# Patient Record
Sex: FEMALE | HISPANIC OR LATINO | URBAN - METROPOLITAN AREA
[De-identification: names, ages, dates, MRNs, and addresses within clinical notes are randomized per-mention and may not be internally consistent; named-entity substitution may affect disease eponyms.]

---

## 2017-08-03 ENCOUNTER — INPATIENT (INPATIENT)
Facility: HOSPITAL | Age: 82
LOS: 1 days | Discharge: HOME | End: 2017-08-05
Attending: INTERNAL MEDICINE | Admitting: INTERNAL MEDICINE
Payer: MEDICAID

## 2017-08-03 VITALS — WEIGHT: 104.94 LBS | HEIGHT: 62 IN

## 2017-08-03 LAB
ACETONE SERPL-MCNC: NEGATIVE — SIGNIFICANT CHANGE UP
ALBUMIN SERPL ELPH-MCNC: 4 G/DL — SIGNIFICANT CHANGE UP (ref 3.3–5)
ALP SERPL-CCNC: 243 U/L — HIGH (ref 40–120)
ALT FLD-CCNC: 25 U/L — SIGNIFICANT CHANGE UP (ref 12–78)
ANION GAP SERPL CALC-SCNC: 7 MMOL/L — SIGNIFICANT CHANGE UP (ref 5–17)
ANION GAP SERPL CALC-SCNC: 8 MMOL/L — SIGNIFICANT CHANGE UP (ref 5–17)
APTT BLD: 26.3 SEC — LOW (ref 27.5–37.4)
AST SERPL-CCNC: 19 U/L — SIGNIFICANT CHANGE UP (ref 15–37)
BASOPHILS # BLD AUTO: 0.1 K/UL — SIGNIFICANT CHANGE UP (ref 0–0.2)
BASOPHILS NFR BLD AUTO: 1.3 % — SIGNIFICANT CHANGE UP (ref 0–2)
BILIRUB SERPL-MCNC: 0.5 MG/DL — SIGNIFICANT CHANGE UP (ref 0.2–1.2)
BUN SERPL-MCNC: 11 MG/DL — SIGNIFICANT CHANGE UP (ref 7–23)
BUN SERPL-MCNC: 12 MG/DL — SIGNIFICANT CHANGE UP (ref 7–23)
CALCIUM SERPL-MCNC: 8.2 MG/DL — LOW (ref 8.5–10.1)
CALCIUM SERPL-MCNC: 9.1 MG/DL — SIGNIFICANT CHANGE UP (ref 8.5–10.1)
CHLORIDE SERPL-SCNC: 101 MMOL/L — SIGNIFICANT CHANGE UP (ref 96–108)
CHLORIDE SERPL-SCNC: 105 MMOL/L — SIGNIFICANT CHANGE UP (ref 96–108)
CK SERPL-CCNC: 64 U/L — SIGNIFICANT CHANGE UP (ref 26–192)
CO2 SERPL-SCNC: 26 MMOL/L — SIGNIFICANT CHANGE UP (ref 22–31)
CO2 SERPL-SCNC: 27 MMOL/L — SIGNIFICANT CHANGE UP (ref 22–31)
CREAT SERPL-MCNC: 0.58 MG/DL — SIGNIFICANT CHANGE UP (ref 0.5–1.3)
CREAT SERPL-MCNC: 0.75 MG/DL — SIGNIFICANT CHANGE UP (ref 0.5–1.3)
EOSINOPHIL # BLD AUTO: 0.4 K/UL — SIGNIFICANT CHANGE UP (ref 0–0.5)
EOSINOPHIL NFR BLD AUTO: 5 % — SIGNIFICANT CHANGE UP (ref 0–6)
GLUCOSE SERPL-MCNC: 343 MG/DL — HIGH (ref 70–99)
GLUCOSE SERPL-MCNC: 355 MG/DL — HIGH (ref 70–99)
HCT VFR BLD CALC: 45.3 % — HIGH (ref 34.5–45)
HGB BLD-MCNC: 14.9 G/DL — SIGNIFICANT CHANGE UP (ref 11.5–15.5)
INR BLD: 0.97 RATIO — SIGNIFICANT CHANGE UP (ref 0.88–1.16)
LYMPHOCYTES # BLD AUTO: 3.3 K/UL — SIGNIFICANT CHANGE UP (ref 1–3.3)
LYMPHOCYTES # BLD AUTO: 44.2 % — HIGH (ref 13–44)
MCHC RBC-ENTMCNC: 26.8 PG — LOW (ref 27–34)
MCHC RBC-ENTMCNC: 33 GM/DL — SIGNIFICANT CHANGE UP (ref 32–36)
MCV RBC AUTO: 81.2 FL — SIGNIFICANT CHANGE UP (ref 80–100)
MONOCYTES # BLD AUTO: 0.6 K/UL — SIGNIFICANT CHANGE UP (ref 0–0.9)
MONOCYTES NFR BLD AUTO: 7.4 % — SIGNIFICANT CHANGE UP (ref 2–14)
NEUTROPHILS # BLD AUTO: 3.2 K/UL — SIGNIFICANT CHANGE UP (ref 1.8–7.4)
NEUTROPHILS NFR BLD AUTO: 42.1 % — LOW (ref 43–77)
PLATELET # BLD AUTO: 242 K/UL — SIGNIFICANT CHANGE UP (ref 150–400)
POTASSIUM SERPL-MCNC: 3.9 MMOL/L — SIGNIFICANT CHANGE UP (ref 3.5–5.3)
POTASSIUM SERPL-MCNC: 4 MMOL/L — SIGNIFICANT CHANGE UP (ref 3.5–5.3)
POTASSIUM SERPL-SCNC: 3.9 MMOL/L — SIGNIFICANT CHANGE UP (ref 3.5–5.3)
POTASSIUM SERPL-SCNC: 4 MMOL/L — SIGNIFICANT CHANGE UP (ref 3.5–5.3)
PROT SERPL-MCNC: 8 GM/DL — SIGNIFICANT CHANGE UP (ref 6–8.3)
PROTHROM AB SERPL-ACNC: 10.5 SEC — SIGNIFICANT CHANGE UP (ref 9.8–12.7)
RBC # BLD: 5.58 M/UL — HIGH (ref 3.8–5.2)
RBC # FLD: 12.4 % — SIGNIFICANT CHANGE UP (ref 10.3–14.5)
SODIUM SERPL-SCNC: 136 MMOL/L — SIGNIFICANT CHANGE UP (ref 135–145)
SODIUM SERPL-SCNC: 138 MMOL/L — SIGNIFICANT CHANGE UP (ref 135–145)
TROPONIN I SERPL-MCNC: 0.01 NG/ML — SIGNIFICANT CHANGE UP (ref 0.01–0.04)
TROPONIN I SERPL-MCNC: 0.02 NG/ML — SIGNIFICANT CHANGE UP (ref 0.01–0.04)
TROPONIN I SERPL-MCNC: <0.015 NG/ML — SIGNIFICANT CHANGE UP (ref 0.01–0.04)
WBC # BLD: 7.5 K/UL — SIGNIFICANT CHANGE UP (ref 3.8–10.5)
WBC # FLD AUTO: 7.5 K/UL — SIGNIFICANT CHANGE UP (ref 3.8–10.5)

## 2017-08-03 PROCEDURE — 70547 MR ANGIOGRAPHY NECK W/O DYE: CPT | Mod: 26

## 2017-08-03 PROCEDURE — 99285 EMERGENCY DEPT VISIT HI MDM: CPT

## 2017-08-03 PROCEDURE — 70551 MRI BRAIN STEM W/O DYE: CPT | Mod: 26

## 2017-08-03 PROCEDURE — 93010 ELECTROCARDIOGRAM REPORT: CPT

## 2017-08-03 PROCEDURE — 70450 CT HEAD/BRAIN W/O DYE: CPT | Mod: 26

## 2017-08-03 PROCEDURE — 93306 TTE W/DOPPLER COMPLETE: CPT | Mod: 26

## 2017-08-03 PROCEDURE — 99223 1ST HOSP IP/OBS HIGH 75: CPT

## 2017-08-03 PROCEDURE — 71010: CPT | Mod: 26

## 2017-08-03 RX ORDER — SODIUM CHLORIDE 9 MG/ML
1000 INJECTION, SOLUTION INTRAVENOUS
Qty: 0 | Refills: 0 | Status: DISCONTINUED | OUTPATIENT
Start: 2017-08-03 | End: 2017-08-04

## 2017-08-03 RX ORDER — DEXTROSE 50 % IN WATER 50 %
25 SYRINGE (ML) INTRAVENOUS ONCE
Qty: 0 | Refills: 0 | Status: DISCONTINUED | OUTPATIENT
Start: 2017-08-03 | End: 2017-08-05

## 2017-08-03 RX ORDER — ASPIRIN/CALCIUM CARB/MAGNESIUM 324 MG
81 TABLET ORAL DAILY
Qty: 0 | Refills: 0 | Status: DISCONTINUED | OUTPATIENT
Start: 2017-08-03 | End: 2017-08-04

## 2017-08-03 RX ORDER — SODIUM CHLORIDE 9 MG/ML
3 INJECTION INTRAMUSCULAR; INTRAVENOUS; SUBCUTANEOUS ONCE
Qty: 0 | Refills: 0 | Status: COMPLETED | OUTPATIENT
Start: 2017-08-03 | End: 2017-08-03

## 2017-08-03 RX ORDER — INSULIN LISPRO 100/ML
VIAL (ML) SUBCUTANEOUS
Qty: 0 | Refills: 0 | Status: DISCONTINUED | OUTPATIENT
Start: 2017-08-03 | End: 2017-08-05

## 2017-08-03 RX ORDER — ASPIRIN/CALCIUM CARB/MAGNESIUM 324 MG
300 TABLET ORAL ONCE
Qty: 0 | Refills: 0 | Status: COMPLETED | OUTPATIENT
Start: 2017-08-03 | End: 2017-08-03

## 2017-08-03 RX ORDER — LEVETIRACETAM 250 MG/1
1000 TABLET, FILM COATED ORAL ONCE
Qty: 0 | Refills: 0 | Status: COMPLETED | OUTPATIENT
Start: 2017-08-03 | End: 2017-08-03

## 2017-08-03 RX ORDER — INSULIN HUMAN 100 [IU]/ML
4 INJECTION, SOLUTION SUBCUTANEOUS ONCE
Qty: 0 | Refills: 0 | Status: COMPLETED | OUTPATIENT
Start: 2017-08-03 | End: 2017-08-03

## 2017-08-03 RX ORDER — HEPARIN SODIUM 5000 [USP'U]/ML
5000 INJECTION INTRAVENOUS; SUBCUTANEOUS EVERY 8 HOURS
Qty: 0 | Refills: 0 | Status: DISCONTINUED | OUTPATIENT
Start: 2017-08-03 | End: 2017-08-05

## 2017-08-03 RX ORDER — SODIUM CHLORIDE 9 MG/ML
1000 INJECTION, SOLUTION INTRAVENOUS
Qty: 0 | Refills: 0 | Status: DISCONTINUED | OUTPATIENT
Start: 2017-08-03 | End: 2017-08-05

## 2017-08-03 RX ORDER — GLUCAGON INJECTION, SOLUTION 0.5 MG/.1ML
1 INJECTION, SOLUTION SUBCUTANEOUS ONCE
Qty: 0 | Refills: 0 | Status: DISCONTINUED | OUTPATIENT
Start: 2017-08-03 | End: 2017-08-05

## 2017-08-03 RX ORDER — INSULIN HUMAN 100 [IU]/ML
INJECTION, SOLUTION SUBCUTANEOUS EVERY 6 HOURS
Qty: 0 | Refills: 0 | Status: DISCONTINUED | OUTPATIENT
Start: 2017-08-03 | End: 2017-08-03

## 2017-08-03 RX ORDER — SODIUM CHLORIDE 9 MG/ML
2000 INJECTION INTRAMUSCULAR; INTRAVENOUS; SUBCUTANEOUS ONCE
Qty: 0 | Refills: 0 | Status: COMPLETED | OUTPATIENT
Start: 2017-08-03 | End: 2017-08-03

## 2017-08-03 RX ORDER — INSULIN GLARGINE 100 [IU]/ML
10 INJECTION, SOLUTION SUBCUTANEOUS AT BEDTIME
Qty: 0 | Refills: 0 | Status: DISCONTINUED | OUTPATIENT
Start: 2017-08-03 | End: 2017-08-04

## 2017-08-03 RX ORDER — DEXTROSE 50 % IN WATER 50 %
1 SYRINGE (ML) INTRAVENOUS ONCE
Qty: 0 | Refills: 0 | Status: DISCONTINUED | OUTPATIENT
Start: 2017-08-03 | End: 2017-08-03

## 2017-08-03 RX ORDER — DEXTROSE 50 % IN WATER 50 %
12.5 SYRINGE (ML) INTRAVENOUS ONCE
Qty: 0 | Refills: 0 | Status: DISCONTINUED | OUTPATIENT
Start: 2017-08-03 | End: 2017-08-05

## 2017-08-03 RX ORDER — LEVETIRACETAM 250 MG/1
500 TABLET, FILM COATED ORAL EVERY 12 HOURS
Qty: 0 | Refills: 0 | Status: DISCONTINUED | OUTPATIENT
Start: 2017-08-03 | End: 2017-08-04

## 2017-08-03 RX ORDER — DEXTROSE 50 % IN WATER 50 %
12.5 SYRINGE (ML) INTRAVENOUS ONCE
Qty: 0 | Refills: 0 | Status: DISCONTINUED | OUTPATIENT
Start: 2017-08-03 | End: 2017-08-03

## 2017-08-03 RX ORDER — PANTOPRAZOLE SODIUM 20 MG/1
40 TABLET, DELAYED RELEASE ORAL
Qty: 0 | Refills: 0 | Status: DISCONTINUED | OUTPATIENT
Start: 2017-08-03 | End: 2017-08-04

## 2017-08-03 RX ORDER — DEXTROSE 50 % IN WATER 50 %
1 SYRINGE (ML) INTRAVENOUS ONCE
Qty: 0 | Refills: 0 | Status: DISCONTINUED | OUTPATIENT
Start: 2017-08-03 | End: 2017-08-05

## 2017-08-03 RX ORDER — INSULIN LISPRO 100/ML
VIAL (ML) SUBCUTANEOUS AT BEDTIME
Qty: 0 | Refills: 0 | Status: DISCONTINUED | OUTPATIENT
Start: 2017-08-03 | End: 2017-08-05

## 2017-08-03 RX ORDER — SODIUM CHLORIDE 9 MG/ML
1000 INJECTION, SOLUTION INTRAVENOUS
Qty: 0 | Refills: 0 | Status: DISCONTINUED | OUTPATIENT
Start: 2017-08-03 | End: 2017-08-03

## 2017-08-03 RX ADMIN — LEVETIRACETAM 400 MILLIGRAM(S): 250 TABLET, FILM COATED ORAL at 10:32

## 2017-08-03 RX ADMIN — HEPARIN SODIUM 5000 UNIT(S): 5000 INJECTION INTRAVENOUS; SUBCUTANEOUS at 22:22

## 2017-08-03 RX ADMIN — LEVETIRACETAM 400 MILLIGRAM(S): 250 TABLET, FILM COATED ORAL at 20:24

## 2017-08-03 RX ADMIN — Medication 1 MILLIGRAM(S): at 08:40

## 2017-08-03 RX ADMIN — SODIUM CHLORIDE 3 MILLILITER(S): 9 INJECTION INTRAMUSCULAR; INTRAVENOUS; SUBCUTANEOUS at 08:20

## 2017-08-03 RX ADMIN — INSULIN GLARGINE 10 UNIT(S): 100 INJECTION, SOLUTION SUBCUTANEOUS at 22:24

## 2017-08-03 RX ADMIN — SODIUM CHLORIDE 1000 MILLILITER(S): 9 INJECTION INTRAMUSCULAR; INTRAVENOUS; SUBCUTANEOUS at 08:00

## 2017-08-03 RX ADMIN — INSULIN HUMAN 4 UNIT(S): 100 INJECTION, SOLUTION SUBCUTANEOUS at 11:25

## 2017-08-03 RX ADMIN — SODIUM CHLORIDE 75 MILLILITER(S): 9 INJECTION, SOLUTION INTRAVENOUS at 15:21

## 2017-08-03 RX ADMIN — HEPARIN SODIUM 5000 UNIT(S): 5000 INJECTION INTRAVENOUS; SUBCUTANEOUS at 15:14

## 2017-08-03 RX ADMIN — Medication 300 MILLIGRAM(S): at 15:10

## 2017-08-03 NOTE — H&P ADULT - NSHPPHYSICALEXAM_GEN_ALL_CORE
PHYSICAL EXAM:    Daily Height in cm: 157.48 (03 Aug 2017 07:54)    Daily     ICU Vital Signs Last 24 Hrs  T(C): 37.2 (03 Aug 2017 12:07), Max: 37.2 (03 Aug 2017 12:07)  T(F): 98.9 (03 Aug 2017 12:07), Max: 98.9 (03 Aug 2017 12:07)  HR: 69 (03 Aug 2017 12:07) (69 - 85)  BP: 127/81 (03 Aug 2017 12:07) (127/81 - 156/88)  BP(mean): --  ABP: --  ABP(mean): --  RR: 14 (03 Aug 2017 12:07) (14 - 14)  SpO2: 98% (03 Aug 2017 12:07) (97% - 98%)      Constitutional: Pt sedated/post ictal, unarousable  HEENT: Atraumatic, [place]  Respiratory: Breath Sounds normal, no rhonchi/wheeze  Cardiovascular: N S1S2; MAHNAZ present  Gastrointestinal: Abdomen soft, non tender, Bowel Sounds present  Extremities: No edema, peripheral pulses present  Neurological: sedated  Skin: Non cellulitic, no rash, ulcers  Lymph Nodes: No lymphadenopathy noted  Back: No CVA tenderness   Musculoskeletal: non tender  Breasts: Deferred  Genitourinary: deferred  Rectal: Deferred

## 2017-08-03 NOTE — H&P ADULT - ASSESSMENT
83/F admitted with     1) New onset Seizures + Dysarthria: TIA r/o CVA + r/o Arrhythmia + ROBERTA + r/o UTI:  admit to tele  serial cardiac enz  EEG/VEEG  lipid profile, TSH, UA  ECHO  MRI/MRA brain, MRA carotids  Neuro checks q 4 hrs  Neuro/cardio consults  asa 81  iv fluids  NPO ; PPI    2) DM 2:  BS in 300s  lantus + ISS  hold metformin for now  check HbA1c    3) DVT prophylaxis: b/l venodynes and heparin s/c    poc discussed with pt's family at bedside , ER doc and team.

## 2017-08-03 NOTE — CONSULT NOTE ADULT - SUBJECTIVE AND OBJECTIVE BOX
Patient is a 83y old  Female who presents with a chief complaint of seizure, difficulty speaking.    HPI:  84 y/o female, with PMHx of DM 2, was BIB family and had a 5 minute seizure in the lobby.  This appeared to be a generalized seizure that aborted without intervention as she was placed in the trauma room per staff in ED.  She has no previous seizure or cardiac history.  Pt has h/o DM and ran out of her  medication 3 days ago. Also, she was having difficulty speaking and facial droop today as per her daughter at bedside.     Pt was able to says that she does not have any CP but mild CHOPRA.  Rest of history provided by daughter.  She had noted to have second seizure while in CT scan room.  Pt now post ictal and sedated.       PAST MEDICAL & SURGICAL HISTORY:  Type 2 diabetes mellitus without complication, without long-term current use of insulin  No significant past surgical history      MEDICATIONS  (STANDING):  heparin  Injectable 5000 Unit(s) SubCutaneous every 8 hours  insulin glargine Injectable (LANTUS) 10 Unit(s) SubCutaneous at bedtime  insulin regular  human corrective regimen sliding scale   SubCutaneous every 6 hours  dextrose 5%. 1000 milliLiter(s) (50 mL/Hr) IV Continuous <Continuous>  dextrose 50% Injectable 12.5 Gram(s) IV Push once  dextrose 5% + sodium chloride 0.9%. 1000 milliLiter(s) (75 mL/Hr) IV Continuous <Continuous>  levETIRAcetam  IVPB 500 milliGRAM(s) IV Intermittent every 12 hours  aspirin  chewable 81 milliGRAM(s) Oral daily  pantoprazole    Tablet 40 milliGRAM(s) Oral before breakfast    MEDICATIONS  (PRN):  dextrose Gel 1 Dose(s) Oral once PRN Blood Glucose LESS THAN 70 milliGRAM(s)/deciliter  glucagon  Injectable 1 milliGRAM(s) IntraMuscular once PRN Glucose LESS THAN 70 milligrams/deciliter      FAMILY HISTORY:  No pertinent family history in first degree relatives      SOCIAL HISTORY:  non smoker, no alcohol use      REVIEW OF SYSTEMS:  CONSTITUTIONAL:  No night sweats.  No fatigue, malaise, lethargy.  No fever or chills.  HEENT:  Eyes:  No visual changes.  No eye pain.      ENT:  No runny nose.  No epistaxis.  No sinus pain.  No sore throat.  No odynophagia.  No ear pain.  No congestion.  RESPIRATORY:  No cough.  No wheeze.  No hemoptysis.  No shortness of breath.  CARDIOVASCULAR:  No chest pains.  No palpitations. c/o  shortness of breath, No orthopnea or PND.  GASTROINTESTINAL:  No abdominal pain.  No nausea or vomiting.  No diarrhea or constipation.  No hematemesis.  No hematochezia.  No melena.  GENITOURINARY:  No urgency.  No frequency.  No dysuria.  No hematuria.  No obstructive symptoms.  No discharge.  No pain.  No significant abnormal bleeding.  MUSCULOSKELETAL:  No musculoskeletal pain.  No joint swelling.  No arthritis.  NEUROLOGICAL:  No tingling or numbness or weakness.  PSYCHIATRIC:  No confusion  SKIN:  No rashes.  No lesions.  No wounds.  ENDOCRINE:  No unexplained weight loss.  No polydipsia.  No polyuria.  No polyphagia.  HEMATOLOGIC:  No anemia.  No purpura.  No petechiae.  No prolonged or excessive bleeding.   ALLERGIC AND IMMUNOLOGIC:  No pruritus.  No swelling.         Vital Signs Last 24 Hrs  T(C): 36.9 (03 Aug 2017 13:51), Max: 37.2 (03 Aug 2017 12:07)  T(F): 98.5 (03 Aug 2017 13:51), Max: 98.9 (03 Aug 2017 12:07)  HR: 58 (03 Aug 2017 13:51) (58 - 85)  BP: 119/75 (03 Aug 2017 13:51) (119/75 - 156/88)  BP(mean): --  RR: 14 (03 Aug 2017 13:51) (14 - 14)  SpO2: 100% (03 Aug 2017 13:51) (97% - 100%)    PHYSICAL EXAM-    Constitutional: elderly frail lady, ill looking    Head: Head is normocephalic and atraumatic.      Neck: The patient's neck is supple without enlargement, has no palpable thyromegaly nor thyroid nodules and has no jugular venous distention. No audible carotid bruits. There are strong carotid pulses bilaterally. No JVD.     Cardiovascular: Regular rate and rhythm without S3, S4. No murmurs or rubs are appreciated.      Respiratory: Breath sounds are normal. No rales. No wheezing.    Abdomen: Soft, nontender, nondistended with positive bowel sounds.      Extremity: No tenderness. No  pitting edema No skin discoloration No clubbing No cyanosis.     Neurologic: The patient is alert and oriented.      Skin: No rash, no obvious lesions noted.      Psychiatric: The patient appears to be emotionally stable.      INTERPRETATION OF TELEMETRY: sinus rythm.    ECG: none in chart, ordered one.    I&O's Detail      LABS:                        14.9   7.5   )-----------( 242      ( 03 Aug 2017 08:01 )             45.3     08-03    138  |  105  |  11  ----------------------------<  343<H>  3.9   |  26  |  0.58    Ca    8.2<L>      03 Aug 2017 10:04    TPro  8.0  /  Alb  4.0  /  TBili  0.5  /  DBili  x   /  AST  19  /  ALT  25  /  AlkPhos  243<H>  08-03    CARDIAC MARKERS ( 03 Aug 2017 13:48 )  0.019 ng/mL / x     / 64 U/L / x     / x      CARDIAC MARKERS ( 03 Aug 2017 08:01 )  <0.015 ng/mL / x     / x     / x     / x          PT/INR - ( 03 Aug 2017 08:01 )   PT: 10.5 sec;   INR: 0.97 ratio         PTT - ( 03 Aug 2017 08:01 )  PTT:26.3 sec    I&O's Summary    BNP  RADIOLOGY & ADDITIONAL STUDIES:  < from: CT Head No Cont (08.03.17 @ 08:45) >  EXAM:  CT BRAIN                            PROCEDURE DATE:  08/03/2017          INTERPRETATION:  Exam Date: 8/3/2017 8:45 AM    CT head without IV contrast    CLINICAL INFORMATION:  new onset seizure        TECHNIQUE: Contiguous axial sections wereobtained through the head.     This scan was performed using automatic exposure control (radiation dose   reduction software) to obtain a diagnostic image quality scan with   patient dose as low as reasonably achievable.      COMPARISON: No previous examinations are available for review.     FINDINGS:       There is no evidence of intraparenchymal or extraaxial hemorrhage.     There is no CT evidence of large vessel acute infarct. No mass effect is   found in the brain.  No evidence of midline shift or herniation pattern.    The ventricles, sulci and basal cisterns appear unremarkable.         Aerosolized secretions within the right sphenoid sinus is present.    IMPRESSION:       No acute intracranial findings.      < end of copied text >

## 2017-08-03 NOTE — ED ADULT TRIAGE NOTE - CHIEF COMPLAINT QUOTE
c/o weakness, blurry vision, confusion for past 3 days, pt's daughter states pt has ran out of diabetic medication for past 3 days, and was shaking yesterday, pt has not check glucose levels at home. use of  #[id number] in triage. c/o weakness, blurry vision, confusion for past 3 days, pt's daughter states pt has ran out of diabetic medication for past 3 days, and was shaking yesterday, pt has not check glucose levels at home. use of  #[id number] in triage. patient began to have seizure after triage completed.

## 2017-08-03 NOTE — ED ADULT NURSE NOTE - OBJECTIVE STATEMENT
pt bib family for weakness, ams x 2 days.  pt seized in triage, seizure resolved spontaneously.  Per family pt's speaking has been "slower" and not normal for her.  pt is lethargic.  pt to ct scan upon arrival.

## 2017-08-03 NOTE — H&P ADULT - NSHPLABSRESULTS_GEN_ALL_CORE
14.9   7.5   )-----------( 242      ( 03 Aug 2017 08:01 )             45.3       CBC Full  -  ( 03 Aug 2017 08:01 )  WBC Count : 7.5 K/uL  Hemoglobin : 14.9 g/dL  Hematocrit : 45.3 %  Platelet Count - Automated : 242 K/uL  Mean Cell Volume : 81.2 fl  Mean Cell Hemoglobin : 26.8 pg  Mean Cell Hemoglobin Concentration : 33.0 gm/dL  Auto Neutrophil # : 3.2 K/uL  Auto Lymphocyte # : 3.3 K/uL  Auto Monocyte # : 0.6 K/uL  Auto Eosinophil # : 0.4 K/uL  Auto Basophil # : 0.1 K/uL  Auto Neutrophil % : 42.1 %  Auto Lymphocyte % : 44.2 %  Auto Monocyte % : 7.4 %  Auto Eosinophil % : 5.0 %  Auto Basophil % : 1.3 %      08-03    138  |  105  |  11  ----------------------------<  343<H>  3.9   |  26  |  0.58    Ca    8.2<L>      03 Aug 2017 10:04    TPro  8.0  /  Alb  4.0  /  TBili  0.5  /  DBili  x   /  AST  19  /  ALT  25  /  AlkPhos  243<H>  08-03      LIVER FUNCTIONS - ( 03 Aug 2017 08:01 )  Alb: 4.0 g/dL / Pro: 8.0 gm/dL / ALK PHOS: 243 U/L / ALT: 25 U/L / AST: 19 U/L / GGT: x             PT/INR - ( 03 Aug 2017 08:01 )   PT: 10.5 sec;   INR: 0.97 ratio         PTT - ( 03 Aug 2017 08:01 )  PTT:26.3 sec    CARDIAC MARKERS ( 03 Aug 2017 08:01 )  <0.015 ng/mL / x     / x     / x     / x                    MEDICATIONS  (STANDING):  insulin regular  human recombinant. 4 Unit(s) SubCutaneous once  heparin  Injectable 5000 Unit(s) SubCutaneous every 8 hours  insulin glargine Injectable (LANTUS) 10 Unit(s) SubCutaneous at bedtime  insulin regular  human corrective regimen sliding scale   SubCutaneous every 6 hours  dextrose 5%. 1000 milliLiter(s) (50 mL/Hr) IV Continuous <Continuous>  dextrose 50% Injectable 12.5 Gram(s) IV Push once  dextrose 5% + sodium chloride 0.9%. 1000 milliLiter(s) (75 mL/Hr) IV Continuous <Continuous>  levETIRAcetam  IVPB 500 milliGRAM(s) IV Intermittent every 12 hours

## 2017-08-03 NOTE — CONSULT NOTE ADULT - SUBJECTIVE AND OBJECTIVE BOX
Patient is a 83y old  Female who presents with a chief complaint of seizure, difficulty speaking  and Syrian speaking and history obtained from pt's daughter.      HPI:  84 y/o female, with PMHx of DM 2, was BIB family and had a 5 minute seizure in the lobby.  This appeared to be a generalized seizure that aborted without intervention as she was placed in the trauma room.  she has no previous seizure history.  Pt has h/o DM and ran out of her  medication 3 days ago. Also, she was having difficulty speaking today as per her daughter at bedside.   Pt is post-ictal currently and all history is from family. Pt came from [facility] last month and on Diabetic meds . No prior h/o seizures or CVA.  She had a second seizure while in CT scan room.  Pt now post ictal and sedated. (03 Aug 2017 13:37)      PAST MEDICAL & SURGICAL HISTORY:  Type 2 diabetes mellitus without complication, without long-term current use of insulin  No significant past surgical history      FAMILY HISTORY:  No pertinent family history in first degree relatives      Social Hx:  Nonsmoker, no drug or alcohol use    MEDICATIONS  (STANDING):  heparin  Injectable 5000 Unit(s) SubCutaneous every 8 hours  insulin glargine Injectable (LANTUS) 10 Unit(s) SubCutaneous at bedtime  insulin regular  human corrective regimen sliding scale   SubCutaneous every 6 hours  dextrose 5%. 1000 milliLiter(s) (50 mL/Hr) IV Continuous <Continuous>  dextrose 50% Injectable 12.5 Gram(s) IV Push once  dextrose 5% + sodium chloride 0.9%. 1000 milliLiter(s) (75 mL/Hr) IV Continuous <Continuous>  levETIRAcetam  IVPB 500 milliGRAM(s) IV Intermittent every 12 hours  aspirin  chewable 81 milliGRAM(s) Oral daily  pantoprazole    Tablet 40 milliGRAM(s) Oral before breakfast       Allergies    No Known Allergies      ROS: Pertinent positives in HPI, all other ROS were reviewed and are negative.      Vital Signs Last 24 Hrs  T(C): 36.8 (03 Aug 2017 16:47), Max: 37.2 (03 Aug 2017 12:07)  T(F): 98.2 (03 Aug 2017 16:47), Max: 98.9 (03 Aug 2017 12:07)  HR: 74 (03 Aug 2017 16:47) (58 - 85)  BP: 126/62 (03 Aug 2017 16:47) (119/75 - 156/88)  BP(mean): --  RR: 17 (03 Aug 2017 16:47) (14 - 17)  SpO2: 100% (03 Aug 2017 16:47) (97% - 100%)        Constitutional: lethargic but easily arousable follows simple commands.  HEENT: PERRLA, EOMI,   Neck: Supple.  Respiratory: Breath sounds are clear bilaterally  Cardiovascular: S1 and S2, regular  rhythm  Gastrointestinal: soft, nontender  Extremities:  no edema  Vascular: Carotid  Bruit - no  Musculoskeletal: no joint swelling/tenderness, no abnormal movements  Skin: No rashes    Neurological exam:  HF: Lethargic easily arousable ,speaks Syrian follows commands.   CN: [place], EOMI, VFF, facial sensation normal,  mild RT facial asymmetry, tongue midline, Palate moves equally.  Motor: No pronator drift, Strength 5/5 in all 4 ext, normal bulk and tone.    Sens: Intact to light touch / PP/ VS/ JS    Reflexes: Symmetric and normal .+2 UE +1 KJ AJ 0 , downgoing toes b/l  Coord:   Not tested   Gait/Balance: Cannot test      Labs:   08-03    138  |  105  |  11  ----------------------------<  343<H>  3.9   |  26  |  0.58    Ca    8.2<L>      03 Aug 2017 10:04    TPro  8.0  /  Alb  4.0  /  TBili  0.5  /  DBili  x   /  AST  19  /  ALT  25  /  AlkPhos  243<H>  08-03                              14.9   7.5   )-----------( 242      ( 03 Aug 2017 08:01 )             45.3       Radiology:  - CT Head: 8/3/17    IMPRESSION:       No acute intracranial findings.

## 2017-08-03 NOTE — ED PROVIDER NOTE - OBJECTIVE STATEMENT
84 y/o female BIB family and had a 5 minute seizure in the lobby.  This appeared to be a generalized seizure that aborted without intervention as we placed patient in the trauma room.  she has no previous seizure history.  Pt has h/o DM and ran out of her unknown medication 3 days ago.  Pt is post-ictal currently and all history is from family.

## 2017-08-03 NOTE — CONSULT NOTE ADULT - ASSESSMENT
Seizures as documented- Recommend neurology team evaluation.  CT head report noted.  No evidence of arrythmia so far on tele.  Check EKG and echo.  Close monitoring on tele.  Electrolytes noted to be normal.  Management of seizures and DM pre primary team.    Thank you for allowing me to participate in the care of this patient. Please feel free to contact me with any questions.
Pt with H/o IDDM with New onset seizures with soft focal signs Rt side R/o CVA not a TPA candidate since Sx onset with Seizures and This morning out of TPA window.  REC agree with prsent w/u.  Keppra.  EEG  MRi with MRA.  ASa.  adequate control of BS and underlying metabolic causes.  will f/u.  Discussed with Pt's daughters at bedside.

## 2017-08-03 NOTE — H&P ADULT - HISTORY OF PRESENT ILLNESS
84 y/o female, with PMHx of DM 2, was BIB family and had a 5 minute seizure in the lobby.  This appeared to be a generalized seizure that aborted without intervention as she was placed in the trauma room.  she has no previous seizure history.  Pt has h/o DM and ran out of her  medication 3 days ago. Also, she was having difficulty speaking today as per her daughter at bedside.   Pt is post-ictal currently and all history is from family.   She had a second seizure while in CT scan room.  Pt now post ictal and sedated.

## 2017-08-04 LAB
ANION GAP SERPL CALC-SCNC: 5 MMOL/L — SIGNIFICANT CHANGE UP (ref 5–17)
BUN SERPL-MCNC: 8 MG/DL — SIGNIFICANT CHANGE UP (ref 7–23)
CALCIUM SERPL-MCNC: 8.2 MG/DL — LOW (ref 8.5–10.1)
CHLORIDE SERPL-SCNC: 108 MMOL/L — SIGNIFICANT CHANGE UP (ref 96–108)
CHOLEST SERPL-MCNC: 150 MG/DL — SIGNIFICANT CHANGE UP (ref 10–199)
CO2 SERPL-SCNC: 26 MMOL/L — SIGNIFICANT CHANGE UP (ref 22–31)
CREAT SERPL-MCNC: 0.48 MG/DL — LOW (ref 0.5–1.3)
GLUCOSE SERPL-MCNC: 303 MG/DL — HIGH (ref 70–99)
HBA1C BLD-MCNC: 11.3 % — HIGH (ref 4–5.6)
HCT VFR BLD CALC: 36.4 % — SIGNIFICANT CHANGE UP (ref 34.5–45)
HDLC SERPL-MCNC: 42 MG/DL — SIGNIFICANT CHANGE UP (ref 40–125)
HGB BLD-MCNC: 12.1 G/DL — SIGNIFICANT CHANGE UP (ref 11.5–15.5)
LIPID PNL WITH DIRECT LDL SERPL: 76 MG/DL — SIGNIFICANT CHANGE UP
MCHC RBC-ENTMCNC: 27.1 PG — SIGNIFICANT CHANGE UP (ref 27–34)
MCHC RBC-ENTMCNC: 33.1 GM/DL — SIGNIFICANT CHANGE UP (ref 32–36)
MCV RBC AUTO: 81.7 FL — SIGNIFICANT CHANGE UP (ref 80–100)
PLATELET # BLD AUTO: 190 K/UL — SIGNIFICANT CHANGE UP (ref 150–400)
POTASSIUM SERPL-MCNC: 3.5 MMOL/L — SIGNIFICANT CHANGE UP (ref 3.5–5.3)
POTASSIUM SERPL-SCNC: 3.5 MMOL/L — SIGNIFICANT CHANGE UP (ref 3.5–5.3)
RBC # BLD: 4.46 M/UL — SIGNIFICANT CHANGE UP (ref 3.8–5.2)
RBC # FLD: 12.6 % — SIGNIFICANT CHANGE UP (ref 10.3–14.5)
SODIUM SERPL-SCNC: 139 MMOL/L — SIGNIFICANT CHANGE UP (ref 135–145)
TOTAL CHOLESTEROL/HDL RATIO MEASUREMENT: 3.6 RATIO — SIGNIFICANT CHANGE UP (ref 3.3–7.1)
TRIGL SERPL-MCNC: 159 MG/DL — HIGH (ref 10–149)
TROPONIN I SERPL-MCNC: 0.02 NG/ML — SIGNIFICANT CHANGE UP (ref 0.01–0.04)
TSH SERPL-MCNC: 2.48 UU/ML — SIGNIFICANT CHANGE UP (ref 0.36–3.74)
WBC # BLD: 4.4 K/UL — SIGNIFICANT CHANGE UP (ref 3.8–10.5)
WBC # FLD AUTO: 4.4 K/UL — SIGNIFICANT CHANGE UP (ref 3.8–10.5)

## 2017-08-04 PROCEDURE — 99233 SBSQ HOSP IP/OBS HIGH 50: CPT

## 2017-08-04 PROCEDURE — 95816 EEG AWAKE AND DROWSY: CPT | Mod: 26

## 2017-08-04 PROCEDURE — 93010 ELECTROCARDIOGRAM REPORT: CPT

## 2017-08-04 RX ORDER — HUMAN INSULIN 100 [IU]/ML
5 INJECTION, SUSPENSION SUBCUTANEOUS
Qty: 0 | Refills: 0 | Status: DISCONTINUED | OUTPATIENT
Start: 2017-08-04 | End: 2017-08-05

## 2017-08-04 RX ORDER — LEVETIRACETAM 250 MG/1
500 TABLET, FILM COATED ORAL
Qty: 0 | Refills: 0 | Status: DISCONTINUED | OUTPATIENT
Start: 2017-08-04 | End: 2017-08-05

## 2017-08-04 RX ORDER — INSULIN LISPRO 100/ML
1 VIAL (ML) SUBCUTANEOUS ONCE
Qty: 0 | Refills: 0 | Status: COMPLETED | OUTPATIENT
Start: 2017-08-04 | End: 2017-08-04

## 2017-08-04 RX ADMIN — Medication 81 MILLIGRAM(S): at 12:23

## 2017-08-04 RX ADMIN — Medication 1 UNIT(S): at 05:09

## 2017-08-04 RX ADMIN — HEPARIN SODIUM 5000 UNIT(S): 5000 INJECTION INTRAVENOUS; SUBCUTANEOUS at 06:45

## 2017-08-04 RX ADMIN — Medication 2: at 12:23

## 2017-08-04 RX ADMIN — HEPARIN SODIUM 5000 UNIT(S): 5000 INJECTION INTRAVENOUS; SUBCUTANEOUS at 23:04

## 2017-08-04 RX ADMIN — LEVETIRACETAM 400 MILLIGRAM(S): 250 TABLET, FILM COATED ORAL at 06:45

## 2017-08-04 RX ADMIN — HUMAN INSULIN 5 UNIT(S): 100 INJECTION, SUSPENSION SUBCUTANEOUS at 18:05

## 2017-08-04 RX ADMIN — Medication 6: at 17:09

## 2017-08-04 RX ADMIN — LEVETIRACETAM 500 MILLIGRAM(S): 250 TABLET, FILM COATED ORAL at 17:11

## 2017-08-04 RX ADMIN — HEPARIN SODIUM 5000 UNIT(S): 5000 INJECTION INTRAVENOUS; SUBCUTANEOUS at 16:29

## 2017-08-04 NOTE — DIETITIAN INITIAL EVALUATION ADULT. - OTHER INFO
Nutrition consult received for education. Nutrition consult received for education. Denies any difficulty chewing or swallowing. Skin intact w/ no edema noted. No specific diet followed PTA. HgA1c 11.3 (-256) uncontrolled DM. Diet instruction provided in Estonian for Consistent carbohydrate diet w/ RD contact information provided for any further questions or concerns.

## 2017-08-04 NOTE — PHYSICAL THERAPY INITIAL EVALUATION ADULT - LIVES WITH, PROFILE
currently staying with daughter while visiting from [facility], in a house, unsure if there are stairs to enter, states her bedroom is down 1 flight of stairs

## 2017-08-04 NOTE — PHYSICAL THERAPY INITIAL EVALUATION ADULT - CRITERIA FOR SKILLED THERAPEUTIC INTERVENTIONS
rehab potential/impairments found/therapy frequency/risk reduction/prevention/predicted duration of therapy intervention/anticipated equipment needs at discharge/functional limitations in following categories/anticipated discharge recommendation

## 2017-08-04 NOTE — PROGRESS NOTE ADULT - ASSESSMENT
· Assessment		  Pt with H/o IDDM with New onset seizures with soft focal signs Rt side R/o CVA not a TPA candidate since Sx onset with Seizures and This morning out of TPA window.  REC agree with prsent w/u.   continue Keppra.  EEG results pending.  MRi with MRA. F/u with official results.  ASa.  adequate control of BS and underlying metabolic causes.  will f/u.  OOB ambulate.  Correct underlying metabolic causes.  Discussed with Pt's daughters at bedside.

## 2017-08-04 NOTE — PHYSICAL THERAPY INITIAL EVALUATION ADULT - PERTINENT HX OF CURRENT PROBLEM, REHAB EVAL
Pt is visiting from [facility], presents with new onset of seizures and difficulty speaking.

## 2017-08-04 NOTE — PHYSICAL THERAPY INITIAL EVALUATION ADULT - GENERAL OBSERVATIONS, REHAB EVAL
Pt received supine in bed on 3N, telemetry, awake and pleasant, AAOx2 to person/place, confused, denies pain.

## 2017-08-05 ENCOUNTER — TRANSCRIPTION ENCOUNTER (OUTPATIENT)
Age: 82
End: 2017-08-05

## 2017-08-05 VITALS
OXYGEN SATURATION: 95 % | WEIGHT: 118.61 LBS | RESPIRATION RATE: 17 BRPM | DIASTOLIC BLOOD PRESSURE: 59 MMHG | TEMPERATURE: 98 F | SYSTOLIC BLOOD PRESSURE: 117 MMHG | HEART RATE: 78 BPM

## 2017-08-05 RX ORDER — METFORMIN HYDROCHLORIDE 850 MG/1
1 TABLET ORAL
Qty: 60 | Refills: 0 | OUTPATIENT
Start: 2017-08-05

## 2017-08-05 RX ORDER — HUMAN INSULIN 100 [IU]/ML
5 INJECTION, SUSPENSION SUBCUTANEOUS
Qty: 1 | Refills: 0 | OUTPATIENT
Start: 2017-08-05

## 2017-08-05 RX ORDER — METFORMIN HYDROCHLORIDE 850 MG/1
500 TABLET ORAL
Qty: 0 | Refills: 0 | Status: DISCONTINUED | OUTPATIENT
Start: 2017-08-05 | End: 2017-08-05

## 2017-08-05 RX ORDER — LEVETIRACETAM 250 MG/1
1 TABLET, FILM COATED ORAL
Qty: 60 | Refills: 0 | OUTPATIENT
Start: 2017-08-05

## 2017-08-05 RX ORDER — METFORMIN HYDROCHLORIDE 850 MG/1
1 TABLET ORAL
Qty: 0 | Refills: 0 | COMMUNITY

## 2017-08-05 RX ADMIN — METFORMIN HYDROCHLORIDE 500 MILLIGRAM(S): 850 TABLET ORAL at 10:25

## 2017-08-05 RX ADMIN — HUMAN INSULIN 5 UNIT(S): 100 INJECTION, SUSPENSION SUBCUTANEOUS at 08:43

## 2017-08-05 RX ADMIN — Medication 2: at 08:43

## 2017-08-05 RX ADMIN — LEVETIRACETAM 500 MILLIGRAM(S): 250 TABLET, FILM COATED ORAL at 05:53

## 2017-08-05 RX ADMIN — HEPARIN SODIUM 5000 UNIT(S): 5000 INJECTION INTRAVENOUS; SUBCUTANEOUS at 05:53

## 2017-08-05 NOTE — DISCHARGE NOTE ADULT - PATIENT PORTAL LINK FT
“You can access the [facility] Patient Portal, offered by [facility], by registering with the following website: [email or web address]”

## 2017-08-05 NOTE — DISCHARGE NOTE ADULT - MEDICATION SUMMARY - MEDICATIONS TO TAKE
I will START or STAY ON the medications listed below when I get home from the hospital:    levETIRAcetam 500 mg oral tablet  -- 1 tab(s) by mouth 2 times a day  -- Indication: For Seizure    insulin isophane (NPH) 100 units/mL human recombinant subcutaneous suspension  -- 5 unit(s) subcutaneous once a day (before a meal)  -- Indication: For diabetes    metFORMIN 500 mg oral tablet  -- 1 tab(s) by mouth 2 times a day (with meals)  -- Indication: For diabetes    insulin isophane (NPH) 100 units/mL human recombinant subcutaneous suspension  -- 5 unit(s) subcutaneous once a day (before a meal)  -- Indication: For diabetes

## 2017-08-05 NOTE — DISCHARGE NOTE ADULT - PLAN OF CARE
take keppra as prescribed (sent to your pharmacy) follow up with your pcp and or neurologist. take insulin and metformin as prescribed (Sent to your pharmacy) follow up with your doctor.  Follow a diabetic diet.  Check blood sugars twice a day. Your goal should less than 200 for now.

## 2017-08-05 NOTE — DISCHARGE NOTE ADULT - CARE PLAN
Principal Discharge DX:	Seizure  Goal:	take keppra as prescribed (sent to your pharmacy)  Instructions for follow-up, activity and diet:	follow up with your pcp and or neurologist.  Secondary Diagnosis:	Type 2 diabetes mellitus without complication, without long-term current use of insulin  Goal:	take insulin and metformin as prescribed (Sent to your pharmacy)  Instructions for follow-up, activity and diet:	follow up with your doctor.  Follow a diabetic diet.  Check blood sugars twice a day. Your goal should less than 200 for now.

## 2017-08-05 NOTE — DISCHARGE NOTE ADULT - HOSPITAL COURSE
· Subjective and Objective: 	  Patient is a 83y old  Female who presents with a chief complaint of seizure, difficulty speaking (03 Aug 2017 13:37)      HPI:  84 y/o female, with PMHx of DM 2, was BIB family and had a 5 minute seizure in the lobby.  This appeared to be a generalized seizure that aborted without intervention as she was placed in the trauma room.  she has no previous seizure history.  Pt has h/o DM and ran out of her  medication 3 days ago. Also, she was having difficulty speaking today as per her daughter at bedside.   Pt is post-ictal currently and all history is from family.   She had a second seizure while in CT scan room. Pt now post ictal and sedated. (03 Aug 2017 13:37).    8/4: No complaints. Slow to respond but asymptomatic and offers no complaints. Pt ran out of her diabetes meds.  Denies fever, chills, N, V, abd pain, CP, SOB.  8/5: Pt HD stable. No further seizures. Tolerating keppra and insulin. FS < 200.  Spoke to family and pt about uncontrolled diabetes.  Will send scripts for insulin, metformin and keppra at pharmacy. Pt will need close outpatient follow up. MRI/MRA, echo all unremarkable.    Review of system- Rest of the review of system are normal except mentioned in HPI      Vital Signs Last 24 Hrs  T(C): 36.7 (05 Aug 2017 05:31), Max: 36.9 (04 Aug 2017 16:44)  T(F): 98 (05 Aug 2017 05:31), Max: 98.5 (04 Aug 2017 16:44)  HR: 65 (05 Aug 2017 05:31) (65 - 83)  BP: 117/54 (05 Aug 2017 05:31) (117/54 - 127/63)  BP(mean): --  RR: 17 (05 Aug 2017 05:31) (17 - 17)  SpO2: 96% (05 Aug 2017 05:31) (95% - 98%)    PHYSICAL EXAM:  Constitutional: NAD, awake and alert, frail, elderly  HEENT: PERR, EOMI, Normal Hearing, MMM  Neck: Soft and supple  Respiratory: Breath sounds are clear bilaterally, No wheezing, rales or rhonchi  Cardiovascular: S1 and S2, regular rate and rhythm, no Murmurs, gallops or rubs  Gastrointestinal: Bowel Sounds present, soft, nontender, nondistended, no guarding, no rebound  Extremities: No peripheral edema  Neurological: A/O x 3, no focal deficits  Skin: No rashes    meds/labs: reviewed.    Assessment and Plan:  83/F admitted with     New onset Seizures + Dysarthria: STABLE. Currently asymptomatic.  admit to tele  serial cardiac enz negative  EEG = normal.  lipid profile = acceptable  -TSH  -chest xray = normal  -CTHead -> no acute pathology.  -Echo shows normal EF. No significant valvular abnormalities.  MRI/MRA brain, MRA carotids = no acute pathology  Neuro consult appreciated --> c/w keppra BID  -s/p IVFs    DM 2 with hyperglycemia: Uncontrolled  -A1c > 11  -change lantus to NPH 5u BID for goal FS < 200  -hold metformin and restart on discharge      poc discussed with pt's family       Attending Statement:  40 minutes spent on total encounter; more than 50% of the visit was spent counseling and/or coordinating care by the attending physician.

## 2017-08-05 NOTE — DISCHARGE NOTE ADULT - CARE PROVIDER_API CALL
Co, Coy CARLSON), Internal Medicine  [address] [place] [address]  Phone: [phone]  Fax: [phone]

## 2017-08-09 DIAGNOSIS — R56.9 UNSPECIFIED CONVULSIONS: ICD-10-CM

## 2017-08-09 DIAGNOSIS — E11.65 TYPE 2 DIABETES MELLITUS WITH HYPERGLYCEMIA: ICD-10-CM

## 2017-08-09 DIAGNOSIS — R53.1 WEAKNESS: ICD-10-CM

## 2017-08-09 DIAGNOSIS — R47.1 DYSARTHRIA AND ANARTHRIA: ICD-10-CM

## 2017-08-11 ENCOUNTER — EMERGENCY (EMERGENCY)
Facility: HOSPITAL | Age: 82
LOS: 0 days | Discharge: HOME | End: 2017-08-11
Attending: EMERGENCY MEDICINE | Admitting: EMERGENCY MEDICINE
Payer: MEDICAID

## 2017-08-11 VITALS
HEART RATE: 87 BPM | OXYGEN SATURATION: 100 % | SYSTOLIC BLOOD PRESSURE: 148 MMHG | RESPIRATION RATE: 18 BRPM | DIASTOLIC BLOOD PRESSURE: 75 MMHG | TEMPERATURE: 98 F

## 2017-08-11 VITALS — HEIGHT: 64 IN | WEIGHT: 149.91 LBS

## 2017-08-11 DIAGNOSIS — Z79.4 LONG TERM (CURRENT) USE OF INSULIN: ICD-10-CM
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
## 2017-08-11 DIAGNOSIS — S82.044A NONDISPLACED COMMINUTED FRACTURE OF RIGHT PATELLA, INITIAL ENCOUNTER FOR CLOSED FRACTURE: ICD-10-CM

## 2017-08-11 DIAGNOSIS — S82.001A UNSPECIFIED FRACTURE OF RIGHT PATELLA, INITIAL ENCOUNTER FOR CLOSED FRACTURE: ICD-10-CM

## 2017-08-11 DIAGNOSIS — E11.9 TYPE 2 DIABETES MELLITUS WITHOUT COMPLICATIONS: ICD-10-CM
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
## 2017-08-11 PROCEDURE — 73564 X-RAY EXAM KNEE 4 OR MORE: CPT | Mod: 26,RT

## 2017-08-11 PROCEDURE — 99284 EMERGENCY DEPT VISIT MOD MDM: CPT

## 2017-08-11 RX ORDER — ACETAMINOPHEN 500 MG
1000 TABLET ORAL ONCE
Qty: 0 | Refills: 0 | Status: COMPLETED | OUTPATIENT
Start: 2017-08-11 | End: 2017-08-11

## 2017-08-11 RX ADMIN — Medication 1000 MILLIGRAM(S): at 19:36

## 2017-08-11 NOTE — ED STATDOCS - MUSCULOSKELETAL FINDINGS, MLM
mild joint effusion anterior ttp pain w/ rom pt unable to SLR. mild joint effusion anterior ttp pain w/ rom

## 2017-08-11 NOTE — ED STATDOCS - PROGRESS NOTE DETAILS
Pt. unable to straight leg raise.  comminuted patellar fx on xray.  Ortho resident advising daughter knee immobilizer and ortho FU.  May need Sx correction   Immobilizer placed on patient by orthopedic resident.   Candie Davis PA-C

## 2017-08-11 NOTE — ED STATDOCS - ATTENDING CONTRIBUTION TO CARE
I, Axel Lilly MD, personally saw the patient with ACP.  I have personally performed a face to face diagnostic evaluation on this patient.  I have reviewed the ACP note and agree with the history, exam, and plan of care, except as noted.

## 2017-08-11 NOTE — ED STATDOCS - OBJECTIVE STATEMENT
82 y/o female pmhx DM presents to ED due to a fall yesterday. Pt tried to get up when her knees gave out and she fell. C/o swelling and pain of right knee. Pt did not hit her head, no LOC. Denies fever. Pt unable to walk after fall. She took Ibuprofen today.

## 2017-08-11 NOTE — ED STATDOCS - MEDICAL DECISION MAKING DETAILS
xray pain control reval xray pain control reval  XR showed patella fracture. Seen by orthopedics, discussed operative management vs delayed healing with knee immobilizer with pt and family. Family opted for no surgery at this time, pt placed in immobilizer by ortho and understands need for orthopedics f/u. RTED precautions given

## 2017-08-12 PROBLEM — E11.9 TYPE 2 DIABETES MELLITUS WITHOUT COMPLICATIONS: Chronic | Status: ACTIVE | Noted: 2017-08-03

## 2017-08-17 ENCOUNTER — APPOINTMENT (OUTPATIENT)
Dept: ORTHOPEDIC SURGERY | Facility: CLINIC | Age: 82
End: 2017-08-17
Payer: MEDICAID

## 2017-08-17 VITALS
HEIGHT: 64 IN | DIASTOLIC BLOOD PRESSURE: 67 MMHG | SYSTOLIC BLOOD PRESSURE: 113 MMHG | BODY MASS INDEX: 23.9 KG/M2 | WEIGHT: 140 LBS | HEART RATE: 85 BPM

## 2017-08-17 DIAGNOSIS — Z86.73 PERSONAL HISTORY OF TRANSIENT ISCHEMIC ATTACK (TIA), AND CEREBRAL INFARCTION W/OUT RESIDUAL DEFICITS: ICD-10-CM

## 2017-08-17 DIAGNOSIS — G40.909 EPILEPSY, UNSPECIFIED, NOT INTRACTABLE, W/OUT STATUS EPILEPTICUS: ICD-10-CM

## 2017-08-17 DIAGNOSIS — Z78.9 OTHER SPECIFIED HEALTH STATUS: ICD-10-CM

## 2017-08-17 DIAGNOSIS — Z87.898 PERSONAL HISTORY OF OTHER SPECIFIED CONDITIONS: ICD-10-CM

## 2017-08-17 DIAGNOSIS — Z56.0 UNEMPLOYMENT, UNSPECIFIED: ICD-10-CM

## 2017-08-17 DIAGNOSIS — Z86.39 PERSONAL HISTORY OF OTHER ENDOCRINE, NUTRITIONAL AND METABOLIC DISEASE: ICD-10-CM

## 2017-08-17 PROBLEM — Z00.00 ENCOUNTER FOR PREVENTIVE HEALTH EXAMINATION: Status: ACTIVE | Noted: 2017-08-17

## 2017-08-17 PROCEDURE — 99204 OFFICE O/P NEW MOD 45 MIN: CPT | Mod: 57

## 2017-08-17 PROCEDURE — 27520 TREAT KNEECAP FRACTURE: CPT | Mod: RT

## 2017-08-17 PROCEDURE — 73562 X-RAY EXAM OF KNEE 3: CPT | Mod: RT

## 2017-08-17 SDOH — ECONOMIC STABILITY - INCOME SECURITY: UNEMPLOYMENT, UNSPECIFIED: Z56.0

## 2017-08-19 PROBLEM — G40.909 EPILEPSY: Status: RESOLVED | Noted: 2017-08-17 | Resolved: 2017-08-19

## 2017-08-19 PROBLEM — Z87.898 HISTORY OF SEIZURE: Status: RESOLVED | Noted: 2017-08-17 | Resolved: 2017-08-19

## 2017-08-19 PROBLEM — Z86.39 HISTORY OF DIABETES MELLITUS: Status: RESOLVED | Noted: 2017-08-17 | Resolved: 2017-08-19

## 2017-08-19 PROBLEM — Z78.9 DOES NOT USE ILLICIT DRUGS: Status: ACTIVE | Noted: 2017-08-17

## 2017-08-19 PROBLEM — Z56.0 UNEMPLOYED: Status: ACTIVE | Noted: 2017-08-17

## 2017-08-19 PROBLEM — Z78.9 EXERCISES RARELY: Status: ACTIVE | Noted: 2017-08-17

## 2017-08-19 RX ORDER — METFORMIN HYDROCHLORIDE 500 MG/1
500 TABLET, COATED ORAL
Refills: 0 | Status: ACTIVE | COMMUNITY

## 2017-09-07 ENCOUNTER — APPOINTMENT (OUTPATIENT)
Dept: ORTHOPEDIC SURGERY | Facility: CLINIC | Age: 82
End: 2017-09-07
Payer: SELF-PAY

## 2017-09-07 DIAGNOSIS — S82.001A UNSPECIFIED FRACTURE OF RIGHT PATELLA, INITIAL ENCOUNTER FOR CLOSED FRACTURE: ICD-10-CM

## 2017-09-07 PROCEDURE — 99024 POSTOP FOLLOW-UP VISIT: CPT

## 2017-09-07 PROCEDURE — 73562 X-RAY EXAM OF KNEE 3: CPT | Mod: RT

## 2017-09-13 NOTE — ED ADULT NURSE NOTE - CAS EDN DISCHARGE ASSESSMENT
Resting in bed, sb hr of 40- no ectopy, to continue to monitor.   Alert and oriented to person, place and time

## 2017-09-21 ENCOUNTER — OTHER (OUTPATIENT)
Age: 82
End: 2017-09-21

## 2017-10-10 ENCOUNTER — APPOINTMENT (OUTPATIENT)
Dept: ORTHOPEDIC SURGERY | Facility: CLINIC | Age: 82
End: 2017-10-10

## 2018-07-23 PROBLEM — Z86.73 HISTORY OF STROKE: Status: RESOLVED | Noted: 2017-08-17 | Resolved: 2018-07-23

## 2023-01-05 NOTE — PROGRESS NOTE ADULT - SUBJECTIVE AND OBJECTIVE BOX
Detail Level: Zone
Patient is a 83y old  Female who presents with a chief complaint of seizure, difficulty speaking (03 Aug 2017 13:37)      HPI:  84 y/o female, with PMHx of DM 2, was BIB family and had a 5 minute seizure in the lobby.  This appeared to be a generalized seizure that aborted without intervention as she was placed in the trauma room.  she has no previous seizure history.  Pt has h/o DM and ran out of her  medication 3 days ago. Also, she was having difficulty speaking today as per her daughter at bedside.   Pt is post-ictal currently and all history is from family.   She had a second seizure while in CT scan room.  Pt now post ictal and sedated. (03 Aug 2017 13:37).    8/4: No complaints. Slow to respond but asymptomatic and offers no complaints. Pt ran out of her diabetes meds.  Denies fever, chills, N, V, abd pain, CP, SOB.      Review of system- Rest of the review of system are normal except mentioned in HPI      Vital Signs Last 24 Hrs  T(C): 37 (04 Aug 2017 09:47), Max: 37 (03 Aug 2017 20:48)  T(F): 98.6 (04 Aug 2017 09:47), Max: 98.6 (03 Aug 2017 20:48)  HR: 78 (04 Aug 2017 09:47) (74 - 78)  BP: 105/51 (04 Aug 2017 09:47) (104/51 - 131/71)  BP(mean): --  RR: 16 (03 Aug 2017 20:48) (16 - 17)  SpO2: 98% (04 Aug 2017 09:47) (96% - 100%)    PHYSICAL EXAM:    Constitutional: NAD, awake and alert, frail, elderly  HEENT: PERR, EOMI, Normal Hearing, MMM  Neck: Soft and supple  Respiratory: Breath sounds are clear bilaterally, No wheezing, rales or rhonchi  Cardiovascular: S1 and S2, regular rate and rhythm, no Murmurs, gallops or rubs  Gastrointestinal: Bowel Sounds present, soft, nontender, nondistended, no guarding, no rebound  Extremities: No peripheral edema  Neurological: A/O x 3, no focal deficits  Skin: No rashes    MEDICATIONS  (STANDING):  heparin  Injectable 5000 Unit(s) SubCutaneous every 8 hours  levETIRAcetam  IVPB 500 milliGRAM(s) IV Intermittent every 12 hours  aspirin  chewable 81 milliGRAM(s) Oral daily  pantoprazole    Tablet 40 milliGRAM(s) Oral before breakfast  insulin lispro (HumaLOG) corrective regimen sliding scale   SubCutaneous three times a day before meals  insulin lispro (HumaLOG) corrective regimen sliding scale   SubCutaneous at bedtime  dextrose 5%. 1000 milliLiter(s) (50 mL/Hr) IV Continuous <Continuous>  dextrose 50% Injectable 12.5 Gram(s) IV Push once  dextrose 50% Injectable 25 Gram(s) IV Push once  dextrose 50% Injectable 25 Gram(s) IV Push once  insulin NPH human recombinant 5 Unit(s) SubCutaneous before breakfast  insulin NPH human recombinant 5 Unit(s) SubCutaneous at bedtime    MEDICATIONS  (PRN):  glucagon  Injectable 1 milliGRAM(s) IntraMuscular once PRN Glucose LESS THAN 70 milligrams/deciliter  dextrose Gel 1 Dose(s) Oral once PRN Blood Glucose LESS THAN 70 milliGRAM(s)/deciliter    CARDIAC MARKERS ( 04 Aug 2017 02:21 )  0.019 ng/mL / x     / x     / x     / x      CARDIAC MARKERS ( 03 Aug 2017 19:51 )  0.015 ng/mL / x     / x     / x     / x      CARDIAC MARKERS ( 03 Aug 2017 13:48 )  0.019 ng/mL / x     / 64 U/L / x     / x      CARDIAC MARKERS ( 03 Aug 2017 08:01 )  <0.015 ng/mL / x     / x     / x     / x                                12.1   4.4   )-----------( 190      ( 04 Aug 2017 06:13 )             36.4     08-04    139  |  108  |  8   ----------------------------<  303<H>  3.5   |  26  |  0.48<L>    Ca    8.2<L>      04 Aug 2017 06:13    TPro  8.0  /  Alb  4.0  /  TBili  0.5  /  DBili  x   /  AST  19  /  ALT  25  /  AlkPhos  243<H>  08-03    CAPILLARY BLOOD GLUCOSE  192 (04 Aug 2017 11:37)  256 (04 Aug 2017 04:51)  206 (03 Aug 2017 21:54)        LIVER FUNCTIONS - ( 03 Aug 2017 08:01 )  Alb: 4.0 g/dL / Pro: 8.0 gm/dL / ALK PHOS: 243 U/L / ALT: 25 U/L / AST: 19 U/L / GGT: x           PT/INR - ( 03 Aug 2017 08:01 )   PT: 10.5 sec;   INR: 0.97 ratio         PTT - ( 03 Aug 2017 08:01 )  PTT:26.3 sec      Assessment and Plan:  83/F admitted with     New onset Seizures + Dysarthria:  admit to tele  serial cardiac enz negative  EEG = normal.  lipid profile = acceptable  -TSH  -chest xray = normal  -CTHead -> no acute pathology.  -Echo shows normal EF. No significant valvular abnormalities.  MRI/MRA brain, MRA carotids = no acute pathology  Neuro consult appreciated --> c/w keppra BID  -s/p IVFs    DM 2 with hyperglycemia: Uncontrolled  -A1c > 11  -BS in 200- 300s  -change lantus to NPH 5u BID  -hold metformin and restart on discharge    DVT prophylaxis:  -heparin s/c    poc discussed with pt's family at bedside     Dispo:  -Pending final neuro recs, likely tomorrow.    Attending Statement:  40 minutes spent on total encounter; more than 50% of the visit was spent counseling and/or coordinating care by the attending physician.
· Reason for Referral/Consultation:	R/o TIA/new onset seizures	      · Subjective and Objective: 	  Patient is a 83y old  Female who presents with a chief complaint of seizure, difficulty speaking  and Fijian speaking and history obtained from pt's daughter.      HPI:  84 y/o female, with PMHx of DM 2, was BIB family and had a 5 minute seizure in the lobby.  This appeared to be a generalized seizure that aborted without intervention as she was placed in the trauma room.  she has no previous seizure history.  Pt has h/o DM and ran out of her  medication 3 days ago. Also, she was having difficulty speaking today as per her daughter at bedside.   Pt is post-ictal currently and all history is from family. Pt came [facility] last month and on Diabetic meds . No prior h/o seizures or CVA.  She had a second seizure while in CT scan room.  Pt now post ictal and sedated.     8/4/17 : Pt more alert,responsive,speaks Fijian offers no c/o. no recurrent episodes of LOc,Syncope or Seizures. Wants to go home.      PAST MEDICAL & SURGICAL HISTORY:  Type 2 diabetes mellitus without complication, without long-term current use of insulin  No significant past surgical history      FAMILY HISTORY:  No pertinent family history in first degree relatives    Social Hx:  Nonsmoker, no drug or alcohol use    MEDICATIONS  (STANDING):  heparin  Injectable 5000 Unit(s) SubCutaneous every 8 hours  insulin glargine Injectable (LANTUS) 10 Unit(s) SubCutaneous at bedtime  dextrose 5% + sodium chloride 0.9%. 1000 milliLiter(s) (75 mL/Hr) IV Continuous <Continuous>  levETIRAcetam  IVPB 500 milliGRAM(s) IV Intermittent every 12 hours  aspirin  chewable 81 milliGRAM(s) Oral daily  pantoprazole    Tablet 40 milliGRAM(s) Oral before breakfast  insulin lispro (HumaLOG) corrective regimen sliding scale   SubCutaneous three times a day before meals  insulin lispro (HumaLOG) corrective regimen sliding scale   SubCutaneous at bedtime  dextrose 5%. 1000 milliLiter(s) (50 mL/Hr) IV Continuous <Continuous>  dextrose 50% Injectable 12.5 Gram(s) IV Push once  dextrose 50% Injectable 25 Gram(s) IV Push once  dextrose 50% Injectable 25 Gram(s) IV Push once    Allergies    No Known Allergies      ROS: Pertinent positives in HPI, all other ROS were reviewed and are negative.      Vital Signs Last 24 Hrs  T(C): 36.8 (04 Aug 2017 05:32), Max: 37.2 (03 Aug 2017 12:07)  T(F): 98.2 (04 Aug 2017 05:32), Max: 98.9 (03 Aug 2017 12:07)  HR: 75 (04 Aug 2017 05:32) (58 - 76)  BP: 104/51 (04 Aug 2017 05:32) (104/51 - 131/71)  BP(mean): --  RR: 16 (03 Aug 2017 20:48) (14 - 17)  SpO2: 96% (04 Aug 2017 05:32) (96% - 100%)       Constitutional: lethargic but easily arousable follows simple commands.  HEENT: PERRLA, EOMI,   Neck: Supple.  Respiratory: Breath sounds are clear bilaterally  Cardiovascular: S1 and S2, regular  rhythm  Gastrointestinal: soft, nontender  Extremities:  no edema  Vascular: Carotid  Bruit - no  Musculoskeletal: no joint swelling/tenderness, no abnormal movements  Skin: No rashes    Neurological exam:  HF: Lethargic easily arousable ,speaks Fijian follows commands.   CN: [place], EOMI, VFF, facial sensation normal,  mild RT facial asymmetry, tongue midline, Palate moves equally.  Motor: No pronator drift, Strength 5/5 in all 4 ext, normal bulk and tone.    Sens: Intact to light touch / PP/ VS/ JS    Reflexes: Symmetric and normal .+2 UE +1 KJ AJ 0 , downgoing toes b/l  Coord:   Not tested   Gait/Balance: Cannot test                     12.1   4.4   )-----------( 190      ( 04 Aug 2017 06:13 )             36.4     08-04    139  |  108  |  8   ----------------------------<  303<H>  3.5   |  26  |  0.48<L>    Ca    8.2<L>      04 Aug 2017 06:13    TPro  8.0  /  Alb  4.0  /  TBili  0.5  /  DBili  x   /  AST  19  /  ALT  25  /  AlkPhos  243<H>  08-03          Radiology report:  - CT Head 8/3/17    IMPRESSION:       No acute intracranial findings.  - MRI brain Virtual radiology report No acute pathology F/u with official report  - MRA brain/carotids VR report no stenosis F/u Official report  - EEG pending

## 2024-02-03 NOTE — ED STATDOCS - DR. NAME
Progress Note  Date:2/3/2024       Room:[address] [place] [address]  Patient Name:Estefany Garcia     YOB: 1958     Age:65 y.o.        Subjective    Subjective:  Symptoms:  Resolved.    Diet:  Adequate intake.    Activity level: Returning to normal.    Pain:  She reports no pain.       Review of Systems  Objective         Vitals Last 24 Hours:  TEMPERATURE:  Temp  Av.3 °F (36.8 °C)  Min: 97.7 °F (36.5 °C)  Max: 98.9 °F (37.2 °C)  RESPIRATIONS RANGE: Resp  Av.8  Min: 16  Max: 18  PULSE OXIMETRY RANGE: SpO2  Av.9 %  Min: 90 %  Max: 98 %  PULSE RANGE: Pulse  Av  Min: 66  Max: 76  BLOOD PRESSURE RANGE: Systolic (24hrs), Av , Min:95 , Max:140   ; Diastolic (24hrs), Av, Min:34, Max:90    I/O (24Hr):    Intake/Output Summary (Last 24 hours) at 2/3/2024 1232  Last data filed at 2024 1319  Gross per 24 hour   Intake 500 ml   Output 2000 ml   Net -1500 ml     Objective:  General Appearance:  Comfortable.    Vital signs: (most recent): Blood pressure (!) 117/58, pulse 67, temperature 97.9 °F (36.6 °C), temperature source Oral, resp. rate 18, height 1.651 m (5' 5\"), weight 110.5 kg (243 lb 9.7 oz), SpO2 97 %.  Vital signs are normal.    Lungs:  Normal effort and normal respiratory rate.  Breath sounds clear to auscultation.    Heart: Normal rate.  Regular rhythm.  S1 normal and S2 normal.      Labs/Imaging/Diagnostics    Labs:  CBC:  Recent Labs     24  0445   WBC 6.8 7.7   RBC 3.48* 3.24*   HGB 12.4 11.4*   HCT 37.3 34.4*   .0* 106.0*   RDW 16.6* 17.0*    188     CHEMISTRIES:  Recent Labs     24  0445    136   K 4.7 4.4   CL 93* 95*   CO2 27 27   BUN 45* 28*   CREATININE 4.2* 3.6*   GLUCOSE 174* 284*   MG 3.1*  --      PT/INR:  Recent Labs     24  2215   PROTIME 12.9   INR 0.9     APTT:No results for input(s): \"APTT\" in the last 72 hours.  LIVER PROFILE:  Recent Labs     24  2140   AST 34*   ALT 37*   BILITOT 0.5  Maxx
